# Patient Record
Sex: FEMALE
[De-identification: names, ages, dates, MRNs, and addresses within clinical notes are randomized per-mention and may not be internally consistent; named-entity substitution may affect disease eponyms.]

---

## 2020-03-23 ENCOUNTER — HOSPITAL ENCOUNTER (EMERGENCY)
Dept: HOSPITAL 88 - ER | Age: 80
Discharge: HOME | End: 2020-03-23
Payer: MEDICARE

## 2020-03-23 VITALS — SYSTOLIC BLOOD PRESSURE: 136 MMHG | DIASTOLIC BLOOD PRESSURE: 82 MMHG

## 2020-03-23 VITALS — BODY MASS INDEX: 28.47 KG/M2 | HEIGHT: 60 IN | WEIGHT: 145 LBS

## 2020-03-23 DIAGNOSIS — G62.9: ICD-10-CM

## 2020-03-23 DIAGNOSIS — R20.2: Primary | ICD-10-CM

## 2020-03-23 LAB
ALBUMIN SERPL-MCNC: 4.3 G/DL (ref 3.5–5)
ALBUMIN/GLOB SERPL: 1.1 {RATIO} (ref 0.8–2)
ALP SERPL-CCNC: 74 IU/L (ref 40–150)
ALT SERPL-CCNC: 15 IU/L (ref 0–55)
ANION GAP SERPL CALC-SCNC: 14.2 MMOL/L (ref 8–16)
BACTERIA URNS QL MICRO: (no result) /HPF
BASOPHILS # BLD AUTO: 0.1 10*3/UL (ref 0–0.1)
BASOPHILS NFR BLD AUTO: 0.8 % (ref 0–1)
BILIRUB UR QL: NEGATIVE
BUN SERPL-MCNC: 13 MG/DL (ref 7–26)
BUN/CREAT SERPL: 18 (ref 6–25)
CALCIUM SERPL-MCNC: 9.7 MG/DL (ref 8.4–10.2)
CHLORIDE SERPL-SCNC: 99 MMOL/L (ref 98–107)
CK MB SERPL-MCNC: 7.2 NG/ML (ref 0–5)
CK SERPL-CCNC: 122 IU/L (ref 29–168)
CLARITY UR: CLEAR
CO2 SERPL-SCNC: 26 MMOL/L (ref 22–29)
COLOR UR: YELLOW
DEPRECATED NEUTROPHILS # BLD AUTO: 9 10*3/UL (ref 2.1–6.9)
DEPRECATED RBC URNS MANUAL-ACNC: (no result) /HPF (ref 0–5)
EGFRCR SERPLBLD CKD-EPI 2021: > 60 ML/MIN (ref 60–?)
EOSINOPHIL # BLD AUTO: 0.3 10*3/UL (ref 0–0.4)
EOSINOPHIL NFR BLD AUTO: 2.2 % (ref 0–6)
EPI CELLS URNS QL MICRO: (no result) /LPF
ERYTHROCYTE [DISTWIDTH] IN CORD BLOOD: 15.2 % (ref 11.7–14.4)
GLOBULIN PLAS-MCNC: 3.8 G/DL (ref 2.3–3.5)
GLUCOSE SERPLBLD-MCNC: 111 MG/DL (ref 74–118)
HCT VFR BLD AUTO: 38.7 % (ref 34.2–44.1)
HGB BLD-MCNC: 13 G/DL (ref 12–16)
KETONES UR QL STRIP.AUTO: NEGATIVE
LEUKOCYTE ESTERASE UR QL STRIP.AUTO: NEGATIVE
LYMPHOCYTES # BLD: 1.9 10*3/UL (ref 1–3.2)
LYMPHOCYTES NFR BLD AUTO: 16.1 % (ref 18–39.1)
MCH RBC QN AUTO: 29.1 PG (ref 28–32)
MCHC RBC AUTO-ENTMCNC: 33.6 G/DL (ref 31–35)
MCV RBC AUTO: 86.6 FL (ref 81–99)
MONOCYTES # BLD AUTO: 0.6 10*3/UL (ref 0.2–0.8)
MONOCYTES NFR BLD AUTO: 5.2 % (ref 4.4–11.3)
NEUTS SEG NFR BLD AUTO: 75.1 % (ref 38.7–80)
NITRITE UR QL STRIP.AUTO: NEGATIVE
PLATELET # BLD AUTO: 381 X10E3/UL (ref 140–360)
POTASSIUM SERPL-SCNC: 4.2 MMOL/L (ref 3.5–5.1)
PROT UR QL STRIP.AUTO: NEGATIVE
RBC # BLD AUTO: 4.47 X10E6/UL (ref 3.6–5.1)
SODIUM SERPL-SCNC: 135 MMOL/L (ref 136–145)
SP GR UR STRIP: 1.02 (ref 1.01–1.02)
UROBILINOGEN UR STRIP-MCNC: 0.2 MG/DL (ref 0.2–1)
WBC #/AREA URNS HPF: (no result) /HPF (ref 0–5)

## 2020-03-23 PROCEDURE — 99284 EMERGENCY DEPT VISIT MOD MDM: CPT

## 2020-03-23 PROCEDURE — 83880 ASSAY OF NATRIURETIC PEPTIDE: CPT

## 2020-03-23 PROCEDURE — 82550 ASSAY OF CK (CPK): CPT

## 2020-03-23 PROCEDURE — 82553 CREATINE MB FRACTION: CPT

## 2020-03-23 PROCEDURE — 93005 ELECTROCARDIOGRAM TRACING: CPT

## 2020-03-23 PROCEDURE — 80053 COMPREHEN METABOLIC PANEL: CPT

## 2020-03-23 PROCEDURE — 84484 ASSAY OF TROPONIN QUANT: CPT

## 2020-03-23 PROCEDURE — 85025 COMPLETE CBC W/AUTO DIFF WBC: CPT

## 2020-03-23 PROCEDURE — 70551 MRI BRAIN STEM W/O DYE: CPT

## 2020-03-23 PROCEDURE — 36415 COLL VENOUS BLD VENIPUNCTURE: CPT

## 2020-03-23 PROCEDURE — 81001 URINALYSIS AUTO W/SCOPE: CPT

## 2020-03-23 NOTE — DIAGNOSTIC IMAGING REPORT
Exam: Brain MRI without IV contrast

History: Right-sided numbness

Comparison studies: None.



Technique: 

Sagittal and axial T2 FS, axial DWI, axial T2*GRE, axial T1 FLAIR and axial

coronal T2 FLAIR.

Intravenous contrast: None



Findings:



Scalp: Normal in signal. No masses.

Bone marrow: Normal in signal intensity.



Brain sulci: Appropriate for age.

Ventricles: Normal in size, no hydrocephalus. Focal restricted diffusion in the

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

xanthogranuloma.



Extra axial spaces: No mass, no fluid collection.



Parenchyma:

No mass, hemorrhage or acute ischemia. A few scattered T2 FLAIR hyperintense

foci in the supratentorial white and mildly confluent T2 FLAIR hyperintense

signal changes in the periventricular white matter matter are nonspecific but

are most compatible with chronic microvascular ischemic changes. Small chronic

lacunar infarct present the junction of the body and head of the right caudate

nucleus.



Changes within a pneumatized right sphenoid sinus is concerning for

encephalocele which may contain gliotic brain tissue, meninges and CSF and

measures up to 2.0 cm.



Suprasellar region: No abnormalities.

Craniocervical junction: Patent foramen magnum.  No Chiari malformation.

Vessels: Normal flow-voids in the arteries and sinuses.





Incidental findings: 

Bilateral intraocular lens replacements.

Small left maxillary sinus alveolar recess retention cyst.



IMPRESSION:



1.  No acute ischemia or other acute intracranial abnormalities.

2.  Small lacunar infarct in the right caudate nucleus.

3.  Mild supratentorial chronic microvascular ischemic changes.

4.  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 Recommend

maxillofacial CT to further evaluate the skull base. Skull base MRI to include

dedicated high-resolution/3-D T2 weighted sequence through the skull base may

also further evaluate.



Signed by: Dr. Osorio Sepulveda M.D. on 3/23/2020 1:02 PM

## 2023-04-15 ENCOUNTER — HOSPITAL ENCOUNTER (EMERGENCY)
Dept: HOSPITAL 88 - FSED | Age: 83
Discharge: HOME | End: 2023-04-15
Payer: MEDICARE

## 2023-04-15 VITALS — BODY MASS INDEX: 27.38 KG/M2 | WEIGHT: 145 LBS | HEIGHT: 61 IN

## 2023-04-15 DIAGNOSIS — R06.02: Primary | ICD-10-CM

## 2023-04-15 DIAGNOSIS — R00.2: ICD-10-CM

## 2023-04-15 DIAGNOSIS — E78.5: ICD-10-CM

## 2023-04-15 DIAGNOSIS — E11.65: ICD-10-CM

## 2023-04-15 DIAGNOSIS — R94.31: ICD-10-CM

## 2023-04-15 DIAGNOSIS — I10: ICD-10-CM

## 2023-04-15 PROCEDURE — 84484 ASSAY OF TROPONIN QUANT: CPT

## 2023-04-15 PROCEDURE — 71046 X-RAY EXAM CHEST 2 VIEWS: CPT

## 2023-04-15 PROCEDURE — 85025 COMPLETE CBC W/AUTO DIFF WBC: CPT

## 2023-04-15 PROCEDURE — 93005 ELECTROCARDIOGRAM TRACING: CPT

## 2023-04-15 PROCEDURE — 82553 CREATINE MB FRACTION: CPT

## 2023-04-15 PROCEDURE — 99284 EMERGENCY DEPT VISIT MOD MDM: CPT

## 2023-04-15 PROCEDURE — 80053 COMPREHEN METABOLIC PANEL: CPT

## 2023-06-01 LAB
BASOPHILS # BLD AUTO: 0.1 10*3/UL (ref 0–0.1)
BASOPHILS NFR BLD AUTO: 1.1 % (ref 0–1)
DEPRECATED NEUTROPHILS # BLD AUTO: 5.4 10*3/UL (ref 2.1–6.9)
EOSINOPHIL # BLD AUTO: 0.5 10*3/UL (ref 0–0.4)
EOSINOPHIL NFR BLD AUTO: 5.2 % (ref 0–6)
ERYTHROCYTE [DISTWIDTH] IN CORD BLOOD: 14.9 % (ref 11.7–14.4)
HCT VFR BLD AUTO: 34.8 % (ref 34.2–44.1)
HGB BLD-MCNC: 11.4 G/DL (ref 12–16)
LYMPHOCYTES # BLD: 2 10*3/UL (ref 1–3.2)
LYMPHOCYTES NFR BLD AUTO: 23.2 % (ref 18–39.1)
MCH RBC QN AUTO: 28.1 PG (ref 28–32)
MCHC RBC AUTO-ENTMCNC: 32.8 G/DL (ref 31–35)
MCV RBC AUTO: 85.7 FL (ref 81–99)
MONOCYTES # BLD AUTO: 0.7 10*3/UL (ref 0.2–0.8)
MONOCYTES NFR BLD AUTO: 8.5 % (ref 4.4–11.3)
NEUTS SEG NFR BLD AUTO: 61.5 % (ref 38.7–80)
PLATELET # BLD AUTO: 342 X10E3/UL (ref 140–360)
RBC # BLD AUTO: 4.06 X10E6/UL (ref 3.6–5.1)

## 2023-06-02 ENCOUNTER — HOSPITAL ENCOUNTER (OUTPATIENT)
Dept: HOSPITAL 88 - OR | Age: 83
Discharge: HOME | End: 2023-06-02
Attending: INTERNAL MEDICINE
Payer: MEDICARE

## 2023-06-02 VITALS
RESPIRATION RATE: 14 BRPM | DIASTOLIC BLOOD PRESSURE: 62 MMHG | SYSTOLIC BLOOD PRESSURE: 118 MMHG | OXYGEN SATURATION: 99 % | HEART RATE: 85 BPM

## 2023-06-02 VITALS — TEMPERATURE: 97.6 F

## 2023-06-02 DIAGNOSIS — Z79.899: ICD-10-CM

## 2023-06-02 DIAGNOSIS — R13.10: ICD-10-CM

## 2023-06-02 DIAGNOSIS — K63.89: ICD-10-CM

## 2023-06-02 DIAGNOSIS — K44.9: ICD-10-CM

## 2023-06-02 DIAGNOSIS — Z01.810: ICD-10-CM

## 2023-06-02 DIAGNOSIS — D12.2: ICD-10-CM

## 2023-06-02 DIAGNOSIS — K64.8: ICD-10-CM

## 2023-06-02 DIAGNOSIS — Z79.84: ICD-10-CM

## 2023-06-02 DIAGNOSIS — Z79.82: ICD-10-CM

## 2023-06-02 DIAGNOSIS — M06.9: ICD-10-CM

## 2023-06-02 DIAGNOSIS — I10: ICD-10-CM

## 2023-06-02 DIAGNOSIS — D12.3: ICD-10-CM

## 2023-06-02 DIAGNOSIS — K29.50: ICD-10-CM

## 2023-06-02 DIAGNOSIS — Z01.812: ICD-10-CM

## 2023-06-02 DIAGNOSIS — K57.30: ICD-10-CM

## 2023-06-02 DIAGNOSIS — E78.5: ICD-10-CM

## 2023-06-02 DIAGNOSIS — Z12.11: Primary | ICD-10-CM

## 2023-06-02 DIAGNOSIS — D64.9: ICD-10-CM

## 2023-06-02 DIAGNOSIS — K59.00: ICD-10-CM

## 2023-06-02 PROCEDURE — 45385 COLONOSCOPY W/LESION REMOVAL: CPT

## 2023-06-02 PROCEDURE — 45380 COLONOSCOPY AND BIOPSY: CPT

## 2023-06-02 PROCEDURE — 43239 EGD BIOPSY SINGLE/MULTIPLE: CPT

## 2023-06-02 PROCEDURE — 85025 COMPLETE CBC W/AUTO DIFF WBC: CPT

## 2023-06-02 PROCEDURE — 36415 COLL VENOUS BLD VENIPUNCTURE: CPT

## 2023-06-02 PROCEDURE — 45381 COLONOSCOPY SUBMUCOUS NJX: CPT

## 2023-06-02 PROCEDURE — 82948 REAGENT STRIP/BLOOD GLUCOSE: CPT

## 2023-06-02 PROCEDURE — 93005 ELECTROCARDIOGRAM TRACING: CPT

## 2024-09-27 ENCOUNTER — HOSPITAL ENCOUNTER (OUTPATIENT)
Dept: HOSPITAL 88 - PT | Age: 84
LOS: 3 days | End: 2024-09-30
Attending: SPECIALIST
Payer: MEDICARE

## 2024-09-27 DIAGNOSIS — M47.816: ICD-10-CM

## 2024-09-27 DIAGNOSIS — M17.11: ICD-10-CM

## 2024-09-27 DIAGNOSIS — M16.0: Primary | ICD-10-CM

## 2025-03-28 ENCOUNTER — HOSPITAL ENCOUNTER (INPATIENT)
Dept: HOSPITAL 88 - FSED | Age: 85
LOS: 2 days | Discharge: HOME | DRG: 638 | End: 2025-03-30
Attending: INTERNAL MEDICINE | Admitting: INTERNAL MEDICINE
Payer: MEDICARE

## 2025-03-28 VITALS — WEIGHT: 246 LBS | BODY MASS INDEX: 45.27 KG/M2 | HEIGHT: 62 IN

## 2025-03-28 DIAGNOSIS — E66.01: ICD-10-CM

## 2025-03-28 DIAGNOSIS — E11.628: Primary | ICD-10-CM

## 2025-03-28 DIAGNOSIS — E03.9: ICD-10-CM

## 2025-03-28 DIAGNOSIS — D64.9: ICD-10-CM

## 2025-03-28 DIAGNOSIS — L03.114: ICD-10-CM

## 2025-03-28 DIAGNOSIS — I10: ICD-10-CM

## 2025-03-28 DIAGNOSIS — Z79.82: ICD-10-CM

## 2025-03-28 DIAGNOSIS — Z79.84: ICD-10-CM

## 2025-03-28 DIAGNOSIS — Z79.890: ICD-10-CM

## 2025-03-28 DIAGNOSIS — E78.5: ICD-10-CM

## 2025-03-28 PROCEDURE — 83036 HEMOGLOBIN GLYCOSYLATED A1C: CPT

## 2025-03-28 PROCEDURE — 94799 UNLISTED PULMONARY SVC/PX: CPT

## 2025-03-28 PROCEDURE — 80048 BASIC METABOLIC PNL TOTAL CA: CPT

## 2025-03-28 PROCEDURE — 80061 LIPID PANEL: CPT

## 2025-03-28 PROCEDURE — 76536 US EXAM OF HEAD AND NECK: CPT

## 2025-03-28 PROCEDURE — 36415 COLL VENOUS BLD VENIPUNCTURE: CPT

## 2025-03-28 PROCEDURE — 80076 HEPATIC FUNCTION PANEL: CPT

## 2025-03-28 PROCEDURE — 99284 EMERGENCY DEPT VISIT MOD MDM: CPT

## 2025-03-28 PROCEDURE — 85025 COMPLETE CBC W/AUTO DIFF WBC: CPT

## 2025-03-28 PROCEDURE — 80053 COMPREHEN METABOLIC PANEL: CPT

## 2025-03-28 PROCEDURE — 82948 REAGENT STRIP/BLOOD GLUCOSE: CPT

## 2025-03-29 VITALS
RESPIRATION RATE: 20 BRPM | HEART RATE: 79 BPM | TEMPERATURE: 97.9 F | SYSTOLIC BLOOD PRESSURE: 124 MMHG | OXYGEN SATURATION: 100 % | DIASTOLIC BLOOD PRESSURE: 59 MMHG

## 2025-03-29 VITALS
TEMPERATURE: 98.4 F | SYSTOLIC BLOOD PRESSURE: 154 MMHG | DIASTOLIC BLOOD PRESSURE: 68 MMHG | RESPIRATION RATE: 17 BRPM | HEART RATE: 67 BPM | OXYGEN SATURATION: 100 %

## 2025-03-29 VITALS
HEART RATE: 74 BPM | DIASTOLIC BLOOD PRESSURE: 51 MMHG | OXYGEN SATURATION: 99 % | RESPIRATION RATE: 18 BRPM | TEMPERATURE: 99.3 F | SYSTOLIC BLOOD PRESSURE: 120 MMHG

## 2025-03-29 VITALS
DIASTOLIC BLOOD PRESSURE: 51 MMHG | OXYGEN SATURATION: 100 % | SYSTOLIC BLOOD PRESSURE: 120 MMHG | TEMPERATURE: 99.3 F | HEART RATE: 75 BPM | RESPIRATION RATE: 20 BRPM

## 2025-03-29 VITALS
HEART RATE: 72 BPM | TEMPERATURE: 98 F | SYSTOLIC BLOOD PRESSURE: 151 MMHG | RESPIRATION RATE: 17 BRPM | OXYGEN SATURATION: 100 % | DIASTOLIC BLOOD PRESSURE: 71 MMHG

## 2025-03-29 VITALS
TEMPERATURE: 98.4 F | RESPIRATION RATE: 17 BRPM | SYSTOLIC BLOOD PRESSURE: 154 MMHG | HEART RATE: 67 BPM | OXYGEN SATURATION: 100 % | DIASTOLIC BLOOD PRESSURE: 68 MMHG

## 2025-03-29 VITALS
SYSTOLIC BLOOD PRESSURE: 121 MMHG | TEMPERATURE: 97.5 F | OXYGEN SATURATION: 100 % | HEART RATE: 70 BPM | DIASTOLIC BLOOD PRESSURE: 58 MMHG | RESPIRATION RATE: 18 BRPM

## 2025-03-29 VITALS
SYSTOLIC BLOOD PRESSURE: 119 MMHG | RESPIRATION RATE: 18 BRPM | HEART RATE: 72 BPM | DIASTOLIC BLOOD PRESSURE: 49 MMHG | TEMPERATURE: 98.9 F | OXYGEN SATURATION: 96 %

## 2025-03-29 VITALS — RESPIRATION RATE: 20 BRPM | OXYGEN SATURATION: 100 % | HEART RATE: 75 BPM

## 2025-03-29 VITALS
SYSTOLIC BLOOD PRESSURE: 121 MMHG | HEART RATE: 70 BPM | DIASTOLIC BLOOD PRESSURE: 58 MMHG | OXYGEN SATURATION: 100 % | RESPIRATION RATE: 18 BRPM | TEMPERATURE: 97.5 F

## 2025-03-29 VITALS — TEMPERATURE: 97.7 F | HEART RATE: 80 BPM

## 2025-03-29 LAB
ANION GAP SERPL CALC-SCNC: 14.3 MMOL/L (ref 8–16)
BASOPHILS # BLD AUTO: 0.1 10*3/UL (ref 0–0.1)
BASOPHILS NFR BLD AUTO: 0.7 % (ref 0–1)
BUN SERPL-MCNC: 14 MG/DL (ref 7–26)
BUN/CREAT SERPL: 21 (ref 6–25)
CALCIUM SERPL-MCNC: 9 MG/DL (ref 8.4–10.2)
CHLORIDE SERPL-SCNC: 100 MMOL/L (ref 98–107)
CHOLEST SERPL-MCNC: 142 MD/DL (ref 0–199)
CHOLEST/HDLC SERPL: 2.6 {RATIO} (ref 3–3.6)
CO2 SERPL-SCNC: 21 MMOL/L (ref 22–29)
DEPRECATED NEUTROPHILS # BLD AUTO: 7.9 10*3/UL (ref 2.1–6.9)
EGFRCR SERPLBLD CKD-EPI 2021: 87 ML/MIN (ref 60–?)
EOSINOPHIL # BLD AUTO: 0.6 10*3/UL (ref 0–0.4)
EOSINOPHIL NFR BLD AUTO: 5.5 % (ref 0–6)
ERYTHROCYTE [DISTWIDTH] IN CORD BLOOD: 20.6 % (ref 11.7–14.4)
GLUCOSE SERPLBLD-MCNC: 112 MG/DL (ref 74–118)
HCT VFR BLD AUTO: 29.7 % (ref 34.2–44.1)
HDLC SERPL-MSCNC: 54 MG/DL (ref 40–60)
HGB BLD-MCNC: 9.1 G/DL (ref 12–16)
LDLC SERPL CALC-MCNC: 72 MG/DL (ref 60–130)
LYMPHOCYTES # BLD: 1.4 10*3/UL (ref 1–3.2)
LYMPHOCYTES NFR BLD AUTO: 12.8 % (ref 18–39.1)
MCH RBC QN AUTO: 22.5 PG (ref 28–32)
MCHC RBC AUTO-ENTMCNC: 30.6 G/DL (ref 31–35)
MCV RBC AUTO: 73.3 FL (ref 81–99)
MONOCYTES # BLD AUTO: 0.9 10*3/UL (ref 0.2–0.8)
MONOCYTES NFR BLD AUTO: 8.4 % (ref 4.4–11.3)
NEUTS SEG NFR BLD AUTO: 72.1 % (ref 38.7–80)
PLATELET # BLD AUTO: 384 X10E3/UL (ref 140–360)
POTASSIUM SERPL-SCNC: 4.3 MMOL/L (ref 3.5–5.1)
RBC # BLD AUTO: 4.05 X10E6/UL (ref 3.6–5.1)
SODIUM SERPL-SCNC: 131 MMOL/L (ref 136–145)
TRIGL SERPL-MCNC: 79 MG/DL (ref 0–149)
WBC # BLD: 11.01 X10E3/UL (ref 4.8–10.8)

## 2025-03-29 RX ADMIN — ENOXAPARIN SODIUM SCH MG: 40 INJECTION SUBCUTANEOUS at 17:14

## 2025-03-29 RX ADMIN — INSULIN LISPRO SCH UNIT: 100 INJECTION, SOLUTION INTRAVENOUS; SUBCUTANEOUS at 07:30

## 2025-03-29 RX ADMIN — SODIUM CHLORIDE SCH MLS/HR: 9 INJECTION, SOLUTION INTRAVENOUS at 13:23

## 2025-03-29 RX ADMIN — Medication ONE MG: at 02:46

## 2025-03-30 VITALS
DIASTOLIC BLOOD PRESSURE: 59 MMHG | SYSTOLIC BLOOD PRESSURE: 121 MMHG | HEART RATE: 65 BPM | TEMPERATURE: 98.4 F | RESPIRATION RATE: 20 BRPM | OXYGEN SATURATION: 99 %

## 2025-03-30 VITALS
RESPIRATION RATE: 18 BRPM | OXYGEN SATURATION: 100 % | TEMPERATURE: 98.7 F | DIASTOLIC BLOOD PRESSURE: 52 MMHG | SYSTOLIC BLOOD PRESSURE: 117 MMHG | HEART RATE: 67 BPM

## 2025-03-30 VITALS
RESPIRATION RATE: 18 BRPM | SYSTOLIC BLOOD PRESSURE: 153 MMHG | TEMPERATURE: 98.1 F | HEART RATE: 75 BPM | OXYGEN SATURATION: 100 % | DIASTOLIC BLOOD PRESSURE: 70 MMHG

## 2025-03-30 VITALS
SYSTOLIC BLOOD PRESSURE: 137 MMHG | RESPIRATION RATE: 18 BRPM | HEART RATE: 66 BPM | DIASTOLIC BLOOD PRESSURE: 58 MMHG | TEMPERATURE: 98 F | OXYGEN SATURATION: 97 %

## 2025-03-30 LAB
ALBUMIN SERPL-MCNC: 2.8 G/DL (ref 3.5–5)
ALBUMIN/GLOB SERPL: 0.8 {RATIO} (ref 0.8–2)
ALP SERPL-CCNC: 52 IU/L (ref 40–150)
ALT SERPL-CCNC: 7 IU/L (ref 0–55)
ANION GAP SERPL CALC-SCNC: 13 MMOL/L (ref 8–16)
BASOPHILS # BLD AUTO: 0.1 10*3/UL (ref 0–0.1)
BASOPHILS NFR BLD AUTO: 0.9 % (ref 0–1)
BILIRUB SERPL-MCNC: 0.5 MG/DL (ref 0.2–1.2)
BUN SERPL-MCNC: 14 MG/DL (ref 7–26)
BUN/CREAT SERPL: 19 (ref 6–25)
CALCIUM SERPL-MCNC: 8.6 MG/DL (ref 8.4–10.2)
CHLORIDE SERPL-SCNC: 105 MMOL/L (ref 98–107)
CO2 SERPL-SCNC: 21 MMOL/L (ref 22–29)
DEPRECATED NEUTROPHILS # BLD AUTO: 4.7 10*3/UL (ref 2.1–6.9)
EGFRCR SERPLBLD CKD-EPI 2021: 83 ML/MIN (ref 60–?)
EOSINOPHIL # BLD AUTO: 0.5 10*3/UL (ref 0–0.4)
EOSINOPHIL NFR BLD AUTO: 6.7 % (ref 0–6)
ERYTHROCYTE [DISTWIDTH] IN CORD BLOOD: 20.7 % (ref 11.7–14.4)
GLOBULIN PLAS-MCNC: 3.3 G/DL (ref 2.3–3.5)
GLUCOSE SERPLBLD-MCNC: 87 MG/DL (ref 74–118)
HCT VFR BLD AUTO: 27.4 % (ref 34.2–44.1)
HGB BLD-MCNC: 8.6 G/DL (ref 12–16)
LYMPHOCYTES # BLD: 1.5 10*3/UL (ref 1–3.2)
LYMPHOCYTES NFR BLD AUTO: 19.9 % (ref 18–39.1)
MCH RBC QN AUTO: 22.8 PG (ref 28–32)
MCHC RBC AUTO-ENTMCNC: 31.4 G/DL (ref 31–35)
MCV RBC AUTO: 72.7 FL (ref 81–99)
MONOCYTES # BLD AUTO: 0.8 10*3/UL (ref 0.2–0.8)
MONOCYTES NFR BLD AUTO: 10.5 % (ref 4.4–11.3)
NEUTS SEG NFR BLD AUTO: 61.5 % (ref 38.7–80)
PLATELET # BLD AUTO: 346 X10E3/UL (ref 140–360)
POTASSIUM SERPL-SCNC: 4 MMOL/L (ref 3.5–5.1)
PROT SERPL-MCNC: 6.1 G/DL (ref 6.5–8.1)
RBC # BLD AUTO: 3.77 X10E6/UL (ref 3.6–5.1)
SODIUM SERPL-SCNC: 135 MMOL/L (ref 136–145)
WBC # BLD: 7.63 X10E3/UL (ref 4.8–10.8)

## 2025-03-30 RX ADMIN — SODIUM CHLORIDE ONE: 900 INJECTION, SOLUTION INTRAVENOUS at 00:35
